# Patient Record
Sex: FEMALE | Race: WHITE | NOT HISPANIC OR LATINO | Employment: UNEMPLOYED | URBAN - METROPOLITAN AREA
[De-identification: names, ages, dates, MRNs, and addresses within clinical notes are randomized per-mention and may not be internally consistent; named-entity substitution may affect disease eponyms.]

---

## 2019-10-14 ENCOUNTER — OFFICE VISIT (OUTPATIENT)
Dept: RETAIL CLINIC | Facility: CLINIC | Age: 29
End: 2019-10-14

## 2019-10-14 VITALS
DIASTOLIC BLOOD PRESSURE: 60 MMHG | OXYGEN SATURATION: 99 % | HEART RATE: 64 BPM | TEMPERATURE: 98.2 F | BODY MASS INDEX: 22.68 KG/M2 | WEIGHT: 128 LBS | HEIGHT: 63 IN | RESPIRATION RATE: 20 BRPM | SYSTOLIC BLOOD PRESSURE: 90 MMHG
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
## 2019-10-14 DIAGNOSIS — R22.0 FACIAL SWELLING: Primary | ICD-10-CM

## 2019-10-14 DIAGNOSIS — S00.86XA INSECT BITE OF OTHER PART OF HEAD, INITIAL ENCOUNTER: ICD-10-CM

## 2019-10-14 PROCEDURE — 99203 OFFICE O/P NEW LOW 30 MIN: CPT | Performed by: NURSE PRACTITIONER

## 2019-10-14 RX ORDER — TRIAMCINOLONE ACETONIDE 1 MG/G
CREAM TOPICAL 2 TIMES DAILY
Qty: 28.4 G | Refills: 1 | Status: SHIPPED | OUTPATIENT
Start: 2019-10-14 | End: 2022-09-28

## 2019-10-14 RX ORDER — PREDNISONE 10 MG/1
TABLET ORAL
Qty: 20 TABLET | Refills: 0 | Status: SHIPPED | OUTPATIENT
Start: 2019-10-14 | End: 2022-09-28

## 2019-10-14 NOTE — PROGRESS NOTES
GUERREROKATHIE Lacey is a 29 y.o. female.   Chief Complaint   Patient presents with   • Facial Swelling   • Insect Bite      History of Present Illness   Right sided facial swelling which is worse around the right eye and right nares. She has an insect bite she noticed last night on her forehead and neck as well as her hand and arm. She noticed a small insect on her blanket last night but has not noticed any other bugs/insect except a mosquito. She awoke last night around 3 am to her face being swollen. She has not used any new products or medications. She has taken Benadryl 50 mg. With some improvement on the swelling. She denies respiratory problems or swallowing issues. She just moved into a new condo yesterday and spent her first night there last night. No one in the family has noticed bug bites. She denies any pain or fever.  The following portions of the patient's history were reviewed and updated as appropriate: allergies, current medications, past family history, past medical history, past social history, past surgical history and problem list.    Current Outpatient Medications:   •  predniSONE (DELTASONE) 10 MG tablet, Take 4 tabs po q day for 2 days, then 3 tabs q day for 2, 2 tabs q day for 2 then 1 tab q day for 2 days, then d/c., Disp: 20 tablet, Rfl: 0  •  triamcinolone (KENALOG) 0.1 % cream, Apply  topically to the appropriate area as directed 2 (Two) Times a Day., Disp: 28.4 g, Rfl: 1    No Known Allergies    Review of Systems   Constitutional: Negative.    HENT: Positive for facial swelling.         Right eye and right nares swelling. No pain or warmth.   Eyes: Negative for photophobia, pain, discharge, redness, itching and visual disturbance.        Right periorbital swelling.   Respiratory: Negative.    Cardiovascular: Negative.    Musculoskeletal: Negative.    Allergic/Immunologic: Positive for environmental allergies.        Reports having an allergic reaction to bed bugs at one  "point.    Neurological: Negative.    Hematological: Negative.    Psychiatric/Behavioral: Negative.        Objective     Visit Vitals  BP 90/60   Pulse 64   Temp 98.2 °F (36.8 °C)   Resp 20   Ht 160 cm (63\")   Wt 58.1 kg (128 lb)   SpO2 99%   BMI 22.67 kg/m²         Physical Exam   Constitutional: She is oriented to person, place, and time. She appears well-developed and well-nourished.   HENT:   Head: Normocephalic and atraumatic.   Right Ear: External ear normal.   Left Ear: External ear normal.   Mouth/Throat: Oropharynx is clear and moist. No oropharyngeal exudate.   Eyes: Conjunctivae are normal. Right eye exhibits no discharge. Left eye exhibits no discharge.   Right periorbital swelling and right nares swelling with right turbinate redness and 2 + swelling.   Neck: Normal range of motion. Neck supple.   Cardiovascular: Normal rate, regular rhythm and normal heart sounds.   Pulmonary/Chest: Effort normal and breath sounds normal. No respiratory distress. She has no wheezes.   Lymphadenopathy:     She has no cervical adenopathy.   Neurological: She is alert and oriented to person, place, and time.   Skin: Skin is warm and dry. No rash noted. There is erythema.   Mild periorbital redness of right eye and nose.   Small papular bite noted on forehead, neck, arm and hand. 1 cm in duration and without swelling or redness.    Psychiatric: She has a normal mood and affect. Her behavior is normal.   Nursing note and vitals reviewed.      Lab Results (last 24 hours)     ** No results found for the last 24 hours. **          Assessment/Plan   Alla was seen today for facial swelling and insect bite.    Diagnoses and all orders for this visit:    Facial swelling  -     predniSONE (DELTASONE) 10 MG tablet; Take 4 tabs po q day for 2 days, then 3 tabs q day for 2, 2 tabs q day for 2 then 1 tab q day for 2 days, then d/c.  -     triamcinolone (KENALOG) 0.1 % cream; Apply  topically to the appropriate area as directed 2 " (Two) Times a Day.    Insect bite of other part of head, initial encounter    instructed to check mattresses and couch for any bugs/insects.  Apply ERIN to insect bites prn and steroid cream bid for itching to all area except face.   For any respiratory issues, shortness of air, wheezing or swallowing problems go to the ED.     JOSS Wall

## 2019-10-14 NOTE — PATIENT INSTRUCTIONS
Insect Bite, Adult  An insect bite can make your skin red, itchy, and swollen. An insect bite is different from an insect sting, which happens when an insect injects poison (venom) into the skin.  Some insects can spread disease to people through a bite. However, most insect bites do not lead to disease and are not serious.  What are the causes?  Insects may bite for a variety of reasons, including:  · Hunger.  · To defend themselves.  Insects that bite include:  · Spiders.  · Mosquitoes.  · Ticks.  · Fleas.  · Ants.  · Flies.  · Bedbugs.  What are the signs or symptoms?  Symptoms of this condition include:  · Itching or pain in the bite area.  · Redness and swelling in the bite area.  · An open wound (skin ulcer).  In many cases, symptoms last for 2-4 days.  How is this diagnosed?  This condition is usually diagnosed based on symptoms and a physical exam.  How is this treated?  Treatment is usually not needed. Symptoms often go away on their own. When treatment is recommended, it may involve:  · Applying a cream or lotion to the bitten area. This treatment helps with itching.  · Taking an antibiotic medicine. This treatment is needed if the bite area gets infected.  · Getting a tetanus shot.  · Applying ice to the affected area.  · Medicines called antihistamines. This treatment is needed if you develop an allergic reaction to the insect bite.  Follow these instructions at home:  Bite area care  · Do not scratch the bite area.  · Keep the bite area clean and dry. Wash it every day with soap and water as told by your health care provider.  · Check the bite area every day for signs of infection. Check for:  ? More redness, swelling, or pain.  ? Fluid or blood.  ? Warmth.  ? Pus.  Managing pain, itching, and swelling    · You may apply a baking soda paste, cortisone cream, or calamine lotion to the bite area as told by your health care provider.  · If directed, apply ice to the bite area.  ? Put ice in a plastic  bag.  ? Place a towel between your skin and the bag.  ? Leave the ice on for 20 minutes, 2-3 times per day.  Medicines  · Apply or take over-the-counter and prescription medicines only as told by your health care provider.  · If you were prescribed an antibiotic medicine, use it as told by your health care provider. Do not stop using the antibiotic even if your condition improves.  General instructions  · Keep all follow-up visits as told by your health care provider. This is important.  How is this prevented?  To help reduce your risk of insect bites:  · When you are outdoors, wear clothing that covers your arms and legs.  · Use insect repellent. The best insect repellents contain:  ? DEET, picaridin, oil of lemon eucalyptus (OLE), or HC6649.  ? Higher amounts of an active ingredient.  · If your home windows do not have screens, consider installing them.  Contact a health care provider if:  · You have more redness, swelling, or pain in the bite area.  · You have fluid, blood, or pus coming from the bite area.  · The bite area feels warm to the touch.  · You have a fever.  Get help right away if:  · You have joint pain.  · You have a rash.  · You have shortness of breath.  · You feel unusually tired or sleepy.  · You have neck pain.  · You have a headache.  · You have unusual weakness.  · You have chest pain.  · You have nausea, vomiting, or pain in the abdomen.  This information is not intended to replace advice given to you by your health care provider. Make sure you discuss any questions you have with your health care provider.  Document Released: 01/25/2006 Document Revised: 08/16/2017 Document Reviewed: 06/26/2017  [employer] Interactive Patient Education © 2019 [employer]  Contact Dermatitis  Dermatitis is redness, soreness, and swelling (inflammation) of the skin. Contact dermatitis is a reaction to certain substances that touch the skin. There are two types of contact dermatitis:  · Irritant contact  dermatitis. This type is caused by something that irritates your skin, such as dry hands from washing them too much. This type does not require previous exposure to the substance for a reaction to occur. This type is more common.  · Allergic contact dermatitis. This type is caused by a substance that you are allergic to, such as a nickel allergy or poison ivy. This type only occurs if you have been exposed to the substance (allergen) before. Upon a repeat exposure, your body reacts to the substance. This type is less common.  What are the causes?  Many different substances can cause contact dermatitis. Irritant contact dermatitis is most commonly caused by exposure to:  · Makeup.  · Soaps.  · Detergents.  · Bleaches.  · Acids.  · Metal salts, such as nickel.  Allergic contact dermatitis is most commonly caused by exposure to:  · Poisonous plants.  · Chemicals.  · Jewelry.  · Latex.  · Medicines.  · Preservatives in products, such as clothing.  What increases the risk?  This condition is more likely to develop in:  · People who have jobs that expose them to irritants or allergens.  · People who have certain medical conditions, such as asthma or eczema.  What are the signs or symptoms?  Symptoms of this condition may occur anywhere on your body where the irritant has touched you or is touched by you. Symptoms include:  · Dryness or flaking.  · Redness.  · Cracks.  · Itching.  · Pain or a burning feeling.  · Blisters.  · Drainage of small amounts of blood or clear fluid from skin cracks.  With allergic contact dermatitis, there may also be swelling in areas such as the eyelids, mouth, or genitals.  How is this diagnosed?  This condition is diagnosed with a medical history and physical exam. A patch skin test may be performed to help determine the cause. If the condition is related to your job, you may need to see an occupational medicine specialist.  How is this treated?  Treatment for this condition includes figuring  out what caused the reaction and protecting your skin from further contact. Treatment may also include:  · Steroid creams or ointments. Oral steroid medicines may be needed in more severe cases.  · Antibiotics or antibacterial ointments, if a skin infection is present.  · Antihistamine lotion or an antihistamine taken by mouth to ease itching.  · A bandage (dressing).  Follow these instructions at home:  Skin Care  · Moisturize your skin as needed.  · Apply cool compresses to the affected areas.  · Try taking a bath with:  ? Epsom salts. Follow the instructions on the packaging. You can get these at your local pharmacy or grocery store.  ? Baking soda. Pour a small amount into the bath as directed by your health care provider.  ? Colloidal oatmeal. Follow the instructions on the packaging. You can get this at your local pharmacy or grocery store.  · Try applying baking soda paste to your skin. Stir water into baking soda until it reaches a paste-like consistency.  · Do not scratch your skin.  · Bathe less frequently, such as every other day.  · Bathe in lukewarm water. Avoid using hot water.  Medicines  · Take or apply over-the-counter and prescription medicines only as told by your health care provider.  · If you were prescribed an antibiotic medicine, take or apply your antibiotic as told by your health care provider. Do not stop using the antibiotic even if your condition starts to improve.  General instructions  · Keep all follow-up visits as told by your health care provider. This is important.  · Avoid the substance that caused your reaction. If you do not know what caused it, keep a journal to try to track what caused it. Write down:  ? What you eat.  ? What cosmetic products you use.  ? What you drink.  ? What you wear in the affected area. This includes jewelry.  · If you were given a dressing, take care of it as told by your health care provider. This includes when to change and remove it.  Contact a  health care provider if:  · Your condition does not improve with treatment.  · Your condition gets worse.  · You have signs of infection such as swelling, tenderness, redness, soreness, or warmth in the affected area.  · You have a fever.  · You have new symptoms.  Get help right away if:  · You have a severe headache, neck pain, or neck stiffness.  · You vomit.  · You feel very sleepy.  · You notice red streaks coming from the affected area.  · Your bone or joint underneath the affected area becomes painful after the skin has healed.  · The affected area turns darker.  · You have difficulty breathing.  This information is not intended to replace advice given to you by your health care provider. Make sure you discuss any questions you have with your health care provider.  Document Released: 12/15/2001 Document Revised: 08/01/2018 Document Reviewed: 05/04/2016  [employer] Interactive Patient Education © 2019 [employer]

## 2022-09-28 ENCOUNTER — OFFICE VISIT (OUTPATIENT)
Dept: FAMILY MEDICINE CLINIC | Facility: CLINIC | Age: 32
End: 2022-09-28

## 2022-09-28 VITALS
WEIGHT: 163 LBS | HEART RATE: 76 BPM | DIASTOLIC BLOOD PRESSURE: 78 MMHG | HEIGHT: 62 IN | SYSTOLIC BLOOD PRESSURE: 124 MMHG | BODY MASS INDEX: 30 KG/M2 | OXYGEN SATURATION: 100 %

## 2022-09-28 DIAGNOSIS — F41.9 ANXIETY: ICD-10-CM

## 2022-09-28 DIAGNOSIS — R53.83 FATIGUE, UNSPECIFIED TYPE: Primary | ICD-10-CM

## 2022-09-28 PROCEDURE — 99213 OFFICE O/P EST LOW 20 MIN: CPT | Performed by: PHYSICIAN ASSISTANT

## 2022-09-28 NOTE — PROGRESS NOTES
"Chief Complaint  Anxiety (Having anxiety been worse over the past 3 weeks )    Subjective          Alla Lacey presents to [facility]  History of Present Illness  Patient in today to establish care and discuss anxiety. She states has had issues for past 4+ years but seems to be getting more frequent. Denies symptoms of depression. In past had taken meds prn but feels may need to be on medication daily to help prevent episodes. She states may also get in for counseling to help with coping skills.  States has not had labs checked in a while. Does have intermittent episodes of fatigue. States menstrual cycles are regular- has IUD.   Anxiety  Presents for initial visit. Symptoms include nervous/anxious behavior. Patient reports no chest pain, depressed mood, nausea, palpitations or shortness of breath.           Objective   Vital Signs:   /78 (BP Location: Left arm, Patient Position: Sitting, Cuff Size: Adult)   Pulse 76   Ht 157.5 cm (62\")   Wt 73.9 kg (163 lb)   SpO2 100%   BMI 29.81 kg/m²     Body mass index is 29.81 kg/m².    Review of Systems   Constitutional: Negative for fatigue and fever.   HENT: Negative for congestion and sore throat.    Respiratory: Negative for cough and shortness of breath.    Cardiovascular: Negative for chest pain and palpitations.   Gastrointestinal: Negative for abdominal pain, diarrhea, nausea and vomiting.   Psychiatric/Behavioral: Negative for depressed mood. The patient is nervous/anxious.        Past History:  Medical History: has a past medical history of Anxiety.   Surgical History: has no past surgical history on file.   Family History: family history is not on file.   Social History: reports that she quit smoking about 5 years ago. Her smoking use included cigarettes. She started smoking about 15 years ago. She smoked 1.00 pack per day. She has never used smokeless tobacco. She reports previous alcohol use. She reports that she " does not use drugs.      Current Outpatient Medications:   •  sertraline (Zoloft) 50 MG tablet, Take 1/2 tablet once a day x 1 week; then take 1 tablet once a day, Disp: 30 tablet, Rfl: 0  Allergies: Patient has no known allergies.    Physical Exam  Constitutional:       Appearance: Normal appearance.   HENT:      Right Ear: Tympanic membrane normal.      Left Ear: Tympanic membrane normal.      Mouth/Throat:      Pharynx: Oropharynx is clear.   Eyes:      Conjunctiva/sclera: Conjunctivae normal.      Pupils: Pupils are equal, round, and reactive to light.   Cardiovascular:      Rate and Rhythm: Normal rate and regular rhythm.      Heart sounds: Normal heart sounds.   Pulmonary:      Effort: Pulmonary effort is normal.      Breath sounds: Normal breath sounds.   Abdominal:      Palpations: Abdomen is soft.      Tenderness: There is no abdominal tenderness.   Neurological:      Mental Status: She is oriented to person, place, and time.   Psychiatric:         Mood and Affect: Mood normal.         Behavior: Behavior normal.             Assessment and Plan   Diagnoses and all orders for this visit:    1. Fatigue, unspecified type (Primary)  -     CBC & Differential; Future  -     Comprehensive Metabolic Panel; Future  -     TSH; Future  Will check a few labs today. Encouraged healthy diet and exercise.   2. Anxiety  Will start on zoloft. Discussed possible side effects of medication. Recheck in 3 weeks, prior as needed.   Other orders  -     sertraline (Zoloft) 50 MG tablet; Take 1/2 tablet once a day x 1 week; then take 1 tablet once a day  Dispense: 30 tablet; Refill: 0            Follow Up   No follow-ups on file.  Patient was given instructions and counseling regarding her condition or for health maintenance advice. Please see specific information pulled into the AVS if appropriate.     Marisa Marie PA-C

## 2022-09-29 LAB
ALBUMIN SERPL-MCNC: 4.6 G/DL (ref 3.9–5)
ALBUMIN/GLOB SERPL: 1.8 {RATIO} (ref 1.2–2.2)
ALP SERPL-CCNC: 70 IU/L (ref 44–121)
ALT SERPL-CCNC: 19 IU/L (ref 0–32)
AST SERPL-CCNC: 22 IU/L (ref 0–40)
BASOPHILS # BLD AUTO: 0.1 X10E3/UL (ref 0–0.2)
BASOPHILS NFR BLD AUTO: 1 %
BILIRUB SERPL-MCNC: <0.2 MG/DL (ref 0–1.2)
BUN SERPL-MCNC: 12 MG/DL (ref 6–20)
BUN/CREAT SERPL: 17 (ref 9–23)
CALCIUM SERPL-MCNC: 9.4 MG/DL (ref 8.7–10.2)
CHLORIDE SERPL-SCNC: 102 MMOL/L (ref 96–106)
CO2 SERPL-SCNC: 23 MMOL/L (ref 20–29)
CREAT SERPL-MCNC: 0.69 MG/DL (ref 0.57–1)
EGFRCR SERPLBLD CKD-EPI 2021: 120 ML/MIN/1.73
EOSINOPHIL # BLD AUTO: 0.1 X10E3/UL (ref 0–0.4)
EOSINOPHIL NFR BLD AUTO: 1 %
ERYTHROCYTE [DISTWIDTH] IN BLOOD BY AUTOMATED COUNT: 13.9 % (ref 11.7–15.4)
GLOBULIN SER CALC-MCNC: 2.5 G/DL (ref 1.5–4.5)
GLUCOSE SERPL-MCNC: 87 MG/DL (ref 70–99)
HCT VFR BLD AUTO: 37.4 % (ref 34–46.6)
HGB BLD-MCNC: 12.3 G/DL (ref 11.1–15.9)
IMM GRANULOCYTES # BLD AUTO: 0 X10E3/UL (ref 0–0.1)
IMM GRANULOCYTES NFR BLD AUTO: 0 %
LYMPHOCYTES # BLD AUTO: 2.7 X10E3/UL (ref 0.7–3.1)
LYMPHOCYTES NFR BLD AUTO: 36 %
MCH RBC QN AUTO: 28 PG (ref 26.6–33)
MCHC RBC AUTO-ENTMCNC: 32.9 G/DL (ref 31.5–35.7)
MCV RBC AUTO: 85 FL (ref 79–97)
MONOCYTES # BLD AUTO: 0.6 X10E3/UL (ref 0.1–0.9)
MONOCYTES NFR BLD AUTO: 8 %
NEUTROPHILS # BLD AUTO: 4 X10E3/UL (ref 1.4–7)
NEUTROPHILS NFR BLD AUTO: 54 %
PLATELET # BLD AUTO: 280 X10E3/UL (ref 150–450)
POTASSIUM SERPL-SCNC: 4.8 MMOL/L (ref 3.5–5.2)
PROT SERPL-MCNC: 7.1 G/DL (ref 6–8.5)
RBC # BLD AUTO: 4.39 X10E6/UL (ref 3.77–5.28)
SODIUM SERPL-SCNC: 139 MMOL/L (ref 134–144)
TSH SERPL DL<=0.005 MIU/L-ACNC: 1.23 UIU/ML (ref 0.45–4.5)
WBC # BLD AUTO: 7.5 X10E3/UL (ref 3.4–10.8)

## 2022-11-10 ENCOUNTER — OFFICE VISIT (OUTPATIENT)
Dept: OBSTETRICS AND GYNECOLOGY | Facility: CLINIC | Age: 32
End: 2022-11-10

## 2022-11-10 VITALS
DIASTOLIC BLOOD PRESSURE: 80 MMHG | HEIGHT: 62 IN | BODY MASS INDEX: 30.11 KG/M2 | WEIGHT: 163.6 LBS | SYSTOLIC BLOOD PRESSURE: 110 MMHG

## 2022-11-10 DIAGNOSIS — Z01.419 WOMEN'S ANNUAL ROUTINE GYNECOLOGICAL EXAMINATION: Primary | ICD-10-CM

## 2022-11-10 DIAGNOSIS — N93.9 ABNORMAL UTERINE BLEEDING (AUB): ICD-10-CM

## 2022-11-10 DIAGNOSIS — Z11.3 SCREENING FOR STD (SEXUALLY TRANSMITTED DISEASE): ICD-10-CM

## 2022-11-10 PROCEDURE — 2014F MENTAL STATUS ASSESS: CPT | Performed by: NURSE PRACTITIONER

## 2022-11-10 PROCEDURE — 3008F BODY MASS INDEX DOCD: CPT | Performed by: NURSE PRACTITIONER

## 2022-11-10 PROCEDURE — 99385 PREV VISIT NEW AGE 18-39: CPT | Performed by: NURSE PRACTITIONER

## 2022-11-10 NOTE — PROGRESS NOTES
Gynecologic Annual Exam Note        Establish Care and Gynecologic Exam        Subjective     HPI  Alla Lacey is a 32 y.o.  female who presents for annual well woman exam as a new patient. Patient reports problems with: abnormal uterine bleeding for the last 2-3 months. Patient reports that she has the Paragard IUD that was placed in . Patient reports that she has had normal periods until about 2-3 months ago. Patient reports that she started her period the end of September, and she had had breakthrough bleeding that can range from spotting to heavy bleeding. Patient reports that she might have a few days of no bleeding, but has consistently had some sort of bleeding since September. Patient also reports that she has occasional severe cramping in her bilateral lower abdomen and lower back. Patient reports that the last time this occurred was about a week ago and lasted an hour. Patient does have a history of ovarian cysts. Patient also reports that her mother and mothers mom went through early menopause in their mid 30's and reported similar symptoms. No LMP recorded (lmp unknown). Patient has had an implant. Partner Status: Marital Status: single.  She is sexually active. She has had new partners. STD testing recommendations have been explained to the patient and she does desire STD testing. Patient would also like blood work done.     Additional OB/GYN History   Current contraception: contraceptive methods: IUD.  Insertion date: 2013 - Paragard   Desires to: continue contraception  Thromboembolic Disease: none  Age of menarche: 15    History of STD: yes GC/CHLAMYDIA    Last Pap : ~ Results: negative per patient. [facility] [place]   Last Completed Pap Smear     This patient has no relevant Health Maintenance data.           History of abnormal Pap smear: no  Gardasil status:uncertain if she received the vaccine  Family history of uterine, colon, breast, or  ovarian cancer: no  Performs monthly Self-Breast Exam: yes  Exercises Regularly:no  Feelings of Anxiety or Depression: yes - Anxiety - managed per patient.   Tobacco Usage?: Yes Alla Lacey vapes daily - has nicotine. I have educated her on the risk of diseases from using tobacco products such as cancer, COPD and heart disease.     I advised her to quit and she is not willing to quit.    I spent 3  minutes counseling the patient.          No current outpatient medications on file.     Patient denies the need for medication refills today.    OB History        3    Para   3    Term   3       0    AB   0    Living   3       SAB   0    IAB   0    Ectopic   0    Molar   0    Multiple   0    Live Births   3                Health Maintenance   Topic Date Due   • Annual Gynecologic Pelvic and Breast Exam  Never done   • ANNUAL PHYSICAL  Never done   • HEPATITIS C SCREENING  Never done   • PAP SMEAR  Never done   • COVID-19 Vaccine (3 - Booster for Pfizer series) 2022   • INFLUENZA VACCINE  Never done   • TDAP/TD VACCINES (3 - Td or Tdap) 2022   • Pneumococcal Vaccine 0-64  Aged Out       Past Medical History:   Diagnosis Date   • Anxiety    • Chlamydia     age 19   • Gestational diabetes 2009    I had Gestational Diabetes with my first born child   • Ovarian cyst 1992    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    Review of Systems   Constitutional: Negative.    Cardiovascular: Negative.    Gastrointestinal: Negative.    Genitourinary: Positive for menstrual problem ( AUB).   Psychiatric/Behavioral: Negative.          I have reviewed and agree with the HPI, ROS, and historical  "information as entered above. Maxine Barcenas, APRN        Objective   /80 (BP Location: Left arm, Patient Position: Sitting, Cuff Size: Adult)   Ht 157.5 cm (62\")   Wt 74.2 kg (163 lb 9.6 oz)   LMP  (LMP Unknown) Comment: Paragard - inserted 2013  BMI 29.92 kg/m²     Physical Exam  Vitals and nursing note reviewed. Exam conducted with a chaperone present.   Constitutional:       Appearance: She is well-developed.   HENT:      Head: Normocephalic and atraumatic.   Neck:      Thyroid: No thyroid mass or thyromegaly.   Cardiovascular:      Rate and Rhythm: Normal rate and regular rhythm.      Heart sounds: No murmur heard.  Pulmonary:      Effort: Pulmonary effort is normal. No retractions.      Breath sounds: Normal breath sounds. No wheezing, rhonchi or rales.   Chest:      Chest wall: No mass or tenderness.   Breasts:     Right: Normal. No mass, nipple discharge, skin change or tenderness.      Left: Normal. No mass, nipple discharge, skin change or tenderness.   Abdominal:      Palpations: Abdomen is soft. Abdomen is not rigid. There is no mass.      Tenderness: There is no abdominal tenderness. There is no guarding.      Hernia: No hernia is present.   Genitourinary:     General: Normal vulva.      Labia:         Right: No rash, tenderness or lesion.         Left: No rash, tenderness or lesion.       Vagina: Normal. No vaginal discharge or lesions.      Cervix: No cervical motion tenderness, discharge, lesion or cervical bleeding.      Uterus: Normal. Not enlarged, not fixed and not tender.       Adnexa: Right adnexa normal and left adnexa normal.        Right: No mass or tenderness.          Left: No mass or tenderness.        Rectum: Normal. No external hemorrhoid.      Comments: Paraguard IUD string visualized on exam   Musculoskeletal:      Cervical back: Normal range of motion. No muscular tenderness.   Neurological:      Mental Status: She is alert and oriented to person, place, and time. "   Psychiatric:         Behavior: Behavior normal.            Assessment and Plan    Problem List Items Addressed This Visit    None  Visit Diagnoses     Women's annual routine gynecological examination    -  Primary    Relevant Orders    Hepatitis B Surface Antigen    Hepatitis C Antibody    HIV-1 / O / 2 Ag / Antibody 4th Generation    HSV 1 & 2 - Specific Antibody, IgG    RPR    LIQUID-BASED PAP SMEAR, [facility] ([facility])    Screening for STD (sexually transmitted disease)        Relevant Orders    Hepatitis B Surface Antigen    Hepatitis C Antibody    HIV-1 / O / 2 Ag / Antibody 4th Generation    HSV 1 & 2 - Specific Antibody, IgG    RPR    LIQUID-BASED PAP SMEAR, [facility] ([facility])    Abnormal uterine bleeding (AUB)        Relevant Orders    US Non-ob Transvaginal    CBC (No Diff)    Comprehensive Metabolic Panel    TSH    Follicle Stimulating Hormone    Estradiol          1. GYN annual well woman exam.   2. Ultrasound today for abnormal bleeding past 2 to 3 months. Has Paraguard IUD since 2023.  3. Pt. Request STD testing on pap and blood work  4. Reviewed pap guidelines.   5. Reviewed monthly self breast exams.  Instructed to call with lumps, pain, or breast discharge.    6. Reviewed exercise as a preventative health measures.   7. Reccommended Flu Vaccine in Fall of each year.  8. RTC in 1 year or PRN with problems        Maxine Barcenas, APRN  11/10/2022

## 2022-11-11 LAB
ALBUMIN SERPL-MCNC: 4.5 G/DL (ref 3.9–5)
ALBUMIN/GLOB SERPL: 1.7 {RATIO} (ref 1.2–2.2)
ALP SERPL-CCNC: 71 IU/L (ref 44–121)
ALT SERPL-CCNC: 14 IU/L (ref 0–32)
AST SERPL-CCNC: 18 IU/L (ref 0–40)
BILIRUB SERPL-MCNC: <0.2 MG/DL (ref 0–1.2)
BUN SERPL-MCNC: 11 MG/DL (ref 6–20)
BUN/CREAT SERPL: 18 (ref 9–23)
CALCIUM SERPL-MCNC: 9.5 MG/DL (ref 8.7–10.2)
CHLORIDE SERPL-SCNC: 103 MMOL/L (ref 96–106)
CO2 SERPL-SCNC: 22 MMOL/L (ref 20–29)
CREAT SERPL-MCNC: 0.62 MG/DL (ref 0.57–1)
EGFRCR SERPLBLD CKD-EPI 2021: 123 ML/MIN/1.73
ERYTHROCYTE [DISTWIDTH] IN BLOOD BY AUTOMATED COUNT: 13.1 % (ref 11.7–15.4)
ESTRADIOL SERPL-MCNC: 144 PG/ML
FSH SERPL-ACNC: 4.8 MIU/ML
GLOBULIN SER CALC-MCNC: 2.7 G/DL (ref 1.5–4.5)
GLUCOSE SERPL-MCNC: 88 MG/DL (ref 70–99)
HBV SURFACE AG SERPL QL IA: NEGATIVE
HCT VFR BLD AUTO: 35.6 % (ref 34–46.6)
HCV AB S/CO SERPL IA: <0.1 S/CO RATIO (ref 0–0.9)
HGB BLD-MCNC: 11.3 G/DL (ref 11.1–15.9)
HIV 1+2 AB+HIV1 P24 AG SERPL QL IA: NON REACTIVE
HSV1 IGG SER IA-ACNC: <0.91 INDEX (ref 0–0.9)
HSV2 IGG SER IA-ACNC: 2.51 INDEX (ref 0–0.9)
HSV2 IGG SERPL QL IA: POSITIVE
MCH RBC QN AUTO: 26.6 PG (ref 26.6–33)
MCHC RBC AUTO-ENTMCNC: 31.7 G/DL (ref 31.5–35.7)
MCV RBC AUTO: 84 FL (ref 79–97)
PLATELET # BLD AUTO: 288 X10E3/UL (ref 150–450)
POTASSIUM SERPL-SCNC: 4 MMOL/L (ref 3.5–5.2)
PROT SERPL-MCNC: 7.2 G/DL (ref 6–8.5)
RBC # BLD AUTO: 4.25 X10E6/UL (ref 3.77–5.28)
RPR SER QL: NON REACTIVE
SODIUM SERPL-SCNC: 138 MMOL/L (ref 134–144)
TSH SERPL DL<=0.005 MIU/L-ACNC: 1.27 UIU/ML (ref 0.45–4.5)
WBC # BLD AUTO: 6.4 X10E3/UL (ref 3.4–10.8)

## 2022-11-14 LAB — REF LAB TEST METHOD: NORMAL

## 2024-02-13 ENCOUNTER — OFFICE VISIT (OUTPATIENT)
Dept: OBSTETRICS AND GYNECOLOGY | Facility: CLINIC | Age: 34
End: 2024-02-13
Payer: COMMERCIAL

## 2024-02-13 VITALS
WEIGHT: 140.4 LBS | SYSTOLIC BLOOD PRESSURE: 110 MMHG | DIASTOLIC BLOOD PRESSURE: 65 MMHG | HEIGHT: 62 IN | BODY MASS INDEX: 25.83 KG/M2

## 2024-02-13 DIAGNOSIS — Z23 NONAVALENT HUMAN PAPILLOMA VIRUS (HPV) VACCINE FOR HPV TYPES 6, 11, 16, 18, 31, 33, 45, 52, AND 58 ADMINISTERED: ICD-10-CM

## 2024-02-13 DIAGNOSIS — Z01.419 ENCOUNTER FOR ANNUAL ROUTINE GYNECOLOGICAL EXAMINATION: Primary | ICD-10-CM

## 2024-02-13 DIAGNOSIS — N92.4 PREMENOPAUSAL POSTCOITAL BLEEDING: ICD-10-CM

## 2024-02-13 DIAGNOSIS — N89.8 VAGINAL DISCHARGE: ICD-10-CM

## 2024-02-13 DIAGNOSIS — N63.24 MASS OF LOWER INNER QUADRANT OF LEFT BREAST: ICD-10-CM

## 2024-02-13 DIAGNOSIS — N93.0 PREMENOPAUSAL POSTCOITAL BLEEDING: ICD-10-CM

## 2024-02-17 LAB
A VAGINAE DNA VAG QL NAA+PROBE: NORMAL SCORE
BVAB2 DNA VAG QL NAA+PROBE: NORMAL SCORE
C ALBICANS DNA VAG QL NAA+PROBE: NEGATIVE
C GLABRATA DNA VAG QL NAA+PROBE: NEGATIVE
C KRUSEI DNA VAG QL NAA+PROBE: NEGATIVE
C LUSITANIAE DNA VAG QL NAA+PROBE: NEGATIVE
C TRACH DNA VAG QL NAA+PROBE: NEGATIVE
CANDIDA DNA VAG QL NAA+PROBE: NEGATIVE
MEGA1 DNA VAG QL NAA+PROBE: NORMAL SCORE
N GONORRHOEA DNA VAG QL NAA+PROBE: NEGATIVE
T VAGINALIS DNA VAG QL NAA+PROBE: NEGATIVE

## 2024-04-02 ENCOUNTER — TRANSCRIBE ORDERS (OUTPATIENT)
Dept: ADMINISTRATIVE | Facility: HOSPITAL | Age: 34
End: 2024-04-02
Payer: COMMERCIAL

## 2024-04-02 ENCOUNTER — HOSPITAL ENCOUNTER (OUTPATIENT)
Dept: MAMMOGRAPHY | Facility: HOSPITAL | Age: 34
Discharge: HOME | End: 2024-04-02
Payer: COMMERCIAL

## 2024-04-02 ENCOUNTER — HOSPITAL ENCOUNTER (OUTPATIENT)
Dept: ULTRASOUND IMAGING | Facility: HOSPITAL | Age: 34
Discharge: HOME | End: 2024-04-02
Payer: COMMERCIAL

## 2024-04-02 DIAGNOSIS — N63.24 MASS OF LOWER INNER QUADRANT OF LEFT BREAST: ICD-10-CM

## 2024-04-02 DIAGNOSIS — R92.8 ABNORMAL MAMMOGRAM: Primary | ICD-10-CM

## 2024-04-02 PROCEDURE — 77066 DX MAMMO INCL CAD BI: CPT | Performed by: RADIOLOGY

## 2024-04-02 PROCEDURE — 76642 ULTRASOUND BREAST LIMITED: CPT | Performed by: RADIOLOGY

## 2024-04-02 PROCEDURE — 77066 DX MAMMO INCL CAD BI: CPT

## 2024-04-02 PROCEDURE — 77062 BREAST TOMOSYNTHESIS BI: CPT | Performed by: RADIOLOGY

## 2024-04-02 PROCEDURE — G0279 TOMOSYNTHESIS, MAMMO: HCPCS

## 2024-04-02 PROCEDURE — 76642 ULTRASOUND BREAST LIMITED: CPT

## 2024-10-11 ENCOUNTER — OFFICE VISIT (OUTPATIENT)
Dept: FAMILY MEDICINE CLINIC | Facility: CLINIC | Age: 34
End: 2024-10-11
Payer: COMMERCIAL

## 2024-10-11 VITALS
WEIGHT: 123 LBS | SYSTOLIC BLOOD PRESSURE: 130 MMHG | HEIGHT: 62 IN | DIASTOLIC BLOOD PRESSURE: 74 MMHG | BODY MASS INDEX: 22.63 KG/M2

## 2024-10-11 DIAGNOSIS — F41.9 ANXIETY: Primary | ICD-10-CM

## 2024-10-11 PROCEDURE — 1126F AMNT PAIN NOTED NONE PRSNT: CPT | Performed by: PHYSICIAN ASSISTANT

## 2024-10-11 PROCEDURE — 99214 OFFICE O/P EST MOD 30 MIN: CPT | Performed by: PHYSICIAN ASSISTANT

## 2024-10-11 RX ORDER — ESCITALOPRAM OXALATE 10 MG/1
10 TABLET ORAL DAILY
Qty: 30 TABLET | Refills: 2 | Status: SHIPPED | OUTPATIENT
Start: 2024-10-11

## 2024-10-11 NOTE — PROGRESS NOTES
.Chief Complaint  Anxiety (Pt says she is anxious, short tempered, for about the last 6 weeks and she just can't get it under control.) and Depression    Subjective          History of Present Illness  Alla Lacey is here today with her increased anxiety  History of Present Illness  The patient presents for evaluation of anxiety.    She reports experiencing severe anxiety that has escalated over the past two weeks. She describes it as a persistent, aggravating sensation akin to a knot and finds it increasingly difficult to calm herself, which exacerbates her anxiety. She feels trapped in a cycle of anxiety and is unsure how to manage it. She was previously on Zoloft for daily panic attacks, which have since subsided. She is uncertain about the triggers of her current anxiety, which adds to her distress. Her emotional state is a mix of depression and anxiety, with minor incidents triggering intense emotional responses such as anger or crying. She also experiences episodes of panic unrelated to anger.    She struggles with maintaining focus at work and often feels overwhelmed by her tasks. She occasionally needs to take breaks to cry or express anger, after which she resumes work. She does not consume caffeine or chocolate excessively. She uses nicotine-based vaping products, which she believes help her relax, although she acknowledges the need to quit vaping. She does not take any other medications.    She lives with her children, who do not contribute to her anxiety. She last saw her primary care provider, Audra, one to two years ago and visited a gynecologist last year. She is concerned about early menopause due to irregular periods, which she suspects may be stress-related. Her menstrual cycle started over a week ago, stopped after 4-5 days, and then resumed with heavy bleeding for three days.    SOCIAL HISTORY  She does not drink alcohol. She does not use drugs.    FAMILY HISTORY  Her mother and grandmother  "went through menopause very early.    Objective   Vital Signs:   /74   Ht 157.5 cm (62\")   Wt 55.8 kg (123 lb)   BMI 22.50 kg/m²     Body mass index is 22.5 kg/m².  BMI is within normal parameters. No other follow-up for BMI required.      Review of Systems   Constitutional: Negative.    HENT: Negative.     Eyes: Negative.    Respiratory: Negative.     Cardiovascular: Negative.    Gastrointestinal: Negative.    Endocrine: Negative.    Genitourinary: Negative.    Musculoskeletal: Negative.    Allergic/Immunologic: Negative.    Neurological: Negative.    Psychiatric/Behavioral:  Positive for agitation and depressed mood. The patient is nervous/anxious.      PHQ-9 Depression Screening  Little interest or pleasure in doing things? 0-->not at all   Feeling down, depressed, or hopeless? 0-->not at all   Trouble falling or staying asleep, or sleeping too much?     Feeling tired or having little energy?     Poor appetite or overeating?     Feeling bad about yourself - or that you are a failure or have let yourself or your family down?     Trouble concentrating on things, such as reading the newspaper or watching television?     Moving or speaking so slowly that other people could have noticed? Or the opposite - being so fidgety or restless that you have been moving around a lot more than usual?     Thoughts that you would be better off dead, or of hurting yourself in some way?     PHQ-9 Total Score 0   If you checked off any problems, how difficult have these problems made it for you to do your work, take care of things at home, or get along with other people?     HILLARY-7      Over the last two weeks, how often have you been bothered by the following problems?  Feeling nervous, anxious or on edge: Nearly every day  Not being able to stop or control worrying: Nearly every day  Worrying too much about different things: Nearly every day  Trouble Relaxing: Nearly every day  Being so restless that it is hard to sit still: " Nearly every day  Becoming easily annoyed or irritable: Nearly every day  Feeling afraid as if something awful might happen: Several days  HILLARY 7 Total Score: 19  If you checked any problems, how difficult have these problems made it for you to do your work, take care of things at home, or get along with other people: Extremely difficult    Current Outpatient Medications:     escitalopram (Lexapro) 10 MG tablet, Take 1 tablet by mouth Daily., Disp: 30 tablet, Rfl: 2    Allergies: Patient has no known allergies.    Physical Exam  Constitutional:       Appearance: Normal appearance. She is normal weight.   HENT:      Head: Normocephalic.   Cardiovascular:      Rate and Rhythm: Normal rate and regular rhythm.      Pulses: Normal pulses.      Heart sounds: Normal heart sounds.   Pulmonary:      Effort: Pulmonary effort is normal.      Breath sounds: Normal breath sounds.   Abdominal:      Palpations: Abdomen is soft.   Musculoskeletal:         General: Normal range of motion.   Skin:     General: Skin is warm and dry.      Capillary Refill: Capillary refill takes less than 2 seconds.   Neurological:      General: No focal deficit present.      Mental Status: She is alert.   Psychiatric:         Thought Content: Thought content normal.         Judgment: Judgment normal.        Physical Exam      Result Review :        Office Visit with Marisa Marie PA-C (09/28/2022)   Results               Assessment and Plan    Diagnoses and all orders for this visit:    1. Anxiety (Primary)    start  -     escitalopram (Lexapro) 10 MG tablet; Take 1 tablet by mouth Daily.  Dispense: 30 tablet; Refill: 2      Assessment & Plan  1. Anxiety.  She reports worsening anxiety over the past 2 weeks, with symptoms including difficulty calming down, emotional dysregulation, and near panic attacks. She has a history of using Zoloft for panic attacks but has been off medication for years. Given her current symptoms, an SSRI such as Prozac,  which balances serotonin reuptake, is considered. A referral for talk therapy will be initiated to complement medication. If she experiences increased depression or adverse effects from the medication, she should contact the clinic or go to the ER.      Follow-up  Return in 2 to 4 weeks for follow-up.      Follow Up   Patient educated on possible side effects and encouraged to seek emergency medical care if her symptoms or mood worsens on the new medication.  Patient was given instructions and counseling regarding her condition or for health maintenance advice. Please see specific information pulled into the AVS if appropriate.   ..I saw and evaluated the patient, participating in the key portions of the service.  I reviewed theARNPstudent note.  I agree with the student's findings and plan.   Peri LAM student  Patient or patient representative verbalized consent for the use of Ambient Listening during the visit with  SANDY Brown for chart documentation. 10/14/2024  17:03 EDT    SANDY Brown  10/11/2024

## 2024-11-01 ENCOUNTER — OFFICE VISIT (OUTPATIENT)
Dept: FAMILY MEDICINE CLINIC | Facility: CLINIC | Age: 34
End: 2024-11-01
Payer: COMMERCIAL

## 2024-11-01 VITALS
BODY MASS INDEX: 22.26 KG/M2 | SYSTOLIC BLOOD PRESSURE: 118 MMHG | WEIGHT: 121 LBS | DIASTOLIC BLOOD PRESSURE: 80 MMHG | HEART RATE: 79 BPM | HEIGHT: 62 IN | OXYGEN SATURATION: 97 %

## 2024-11-01 DIAGNOSIS — F41.1 GENERALIZED ANXIETY DISORDER: ICD-10-CM

## 2024-11-01 DIAGNOSIS — F33.1 MAJOR DEPRESSIVE DISORDER, RECURRENT, MODERATE: ICD-10-CM

## 2024-11-01 DIAGNOSIS — Z79.899 HIGH RISK MEDICATION USE: Primary | ICD-10-CM

## 2024-11-01 RX ORDER — ESCITALOPRAM OXALATE 20 MG/1
20 TABLET ORAL DAILY
Qty: 30 TABLET | Refills: 0 | Status: SHIPPED | OUTPATIENT
Start: 2024-11-01

## 2024-11-01 NOTE — PROGRESS NOTES
"Chief Complaint  Anxiety    Subjective          Alla Lacey presents to [facility]  History of Present Illness  Patient in today for medication recheck and refills.  She states the Lexapro has helped with her depression symptoms but still feels like having some issues with her anxiety.  Denies any SI/HI.  She has tolerated the medication well at this time.  She is interested in trying a higher dose of Lexapro to see if can be beneficial.  She also plans to get in with counseling as well.   Anxiety  Presents for follow-up visit.  Symptoms include nervous/anxious behavior.  Patient reports no chest pain, depressed mood, dizziness, nausea, palpitations, shortness of breath or suicidal ideas.       Objective   Vital Signs:   /80   Pulse 79   Ht 157.5 cm (62\")   Wt 54.9 kg (121 lb)   SpO2 97%   BMI 22.13 kg/m²     Body mass index is 22.13 kg/m².    Review of Systems   Constitutional:  Negative for fatigue and fever.   Respiratory:  Negative for shortness of breath.    Cardiovascular:  Negative for chest pain and palpitations.   Gastrointestinal:  Negative for abdominal pain, diarrhea, nausea and vomiting.   Neurological:  Negative for dizziness and headache.   Psychiatric/Behavioral:  Negative for suicidal ideas and depressed mood. The patient is nervous/anxious.        Past History:  Medical History: has a past medical history of Anxiety, Chlamydia, Gestational diabetes (05/2009), and Ovarian cyst (02/1992).   Surgical History: has a past surgical history that includes Eye surgery (Left).   Family History: family history includes Ovarian cancer in her maternal grandmother.   Social History: reports that she quit smoking about 7 years ago. Her smoking use included cigarettes. She started smoking about 17 years ago. She has a 15 pack-year smoking history. She has never used smokeless tobacco. She reports current alcohol use of about 1.0 standard drink of alcohol per week. She " reports that she does not use drugs.      Current Outpatient Medications:     escitalopram (Lexapro) 20 MG tablet, Take 1 tablet by mouth Daily., Disp: 30 tablet, Rfl: 0  Allergies: Patient has no known allergies.    Physical Exam  Constitutional:       Appearance: Normal appearance.   HENT:      Right Ear: Tympanic membrane normal.      Left Ear: Tympanic membrane normal.      Mouth/Throat:      Pharynx: Oropharynx is clear.   Eyes:      Conjunctiva/sclera: Conjunctivae normal.      Pupils: Pupils are equal, round, and reactive to light.   Cardiovascular:      Rate and Rhythm: Normal rate and regular rhythm.      Heart sounds: Normal heart sounds.   Pulmonary:      Effort: Pulmonary effort is normal.      Breath sounds: Normal breath sounds.   Abdominal:      Palpations: Abdomen is soft.      Tenderness: There is no abdominal tenderness.   Neurological:      Mental Status: She is oriented to person, place, and time.   Psychiatric:         Mood and Affect: Mood normal.         Behavior: Behavior normal.             Assessment and Plan   Diagnoses and all orders for this visit:    1. High risk medication use (Primary)  Will increase lexapro dosage to 20 mg once a day; discussed possible side effects; she has also been referred for counseling;  monitor symptoms closely and recheck in 3-4 weeks, prior as needed   2. Generalized anxiety disorder    3. Major depressive disorder, recurrent, moderate    Other orders  -     escitalopram (Lexapro) 20 MG tablet; Take 1 tablet by mouth Daily.  Dispense: 30 tablet; Refill: 0            Follow Up   Return in about 4 weeks (around 11/29/2024).  Patient was given instructions and counseling regarding her condition or for health maintenance advice. Please see specific information pulled into the AVS if appropriate.     Marisa Marie PA-C

## 2025-01-02 NOTE — TELEPHONE ENCOUNTER
Caller: Alla Lacey    Relationship: Self    Best call back number: [phone]     Requested Prescriptions:   Requested Prescriptions     Pending Prescriptions Disp Refills    escitalopram (Lexapro) 20 MG tablet 30 tablet 0     Sig: Take 1 tablet by mouth Daily.        Pharmacy where request should be sent: [facility] [address] [place] [address] [phone] [facility] [phone] FX     Last office visit with prescribing clinician: 11/1/2024   Last telemedicine visit with prescribing clinician: Visit date not found   Next office visit with prescribing clinician: 1/21/2025     Additional details provided by patient: PATIENT IS OUT AND HAS MADE THE SOONEST APPOINTMENT SHE CAN ON 1/21/25. SHE WOULD LIKE ENOUGH TO LAST HER UNTIL THEN IF POSSIBLE.     Does the patient have less than a 3 day supply:  [x] Yes  [] No    Would you like a call back once the refill request has been completed: [] Yes [x] No    If the office needs to give you a call back, can they leave a voicemail: [] Yes [x] No    Cadance Dunaway, RegSched Rep   01/02/25 10:24 EST

## 2025-01-03 RX ORDER — ESCITALOPRAM OXALATE 20 MG/1
20 TABLET ORAL DAILY
Qty: 30 TABLET | Refills: 0 | Status: SHIPPED | OUTPATIENT
Start: 2025-01-03